# Patient Record
Sex: FEMALE | Race: WHITE
[De-identification: names, ages, dates, MRNs, and addresses within clinical notes are randomized per-mention and may not be internally consistent; named-entity substitution may affect disease eponyms.]

---

## 2018-10-30 ENCOUNTER — HOSPITAL ENCOUNTER (EMERGENCY)
Dept: HOSPITAL 25 - UCCORT | Age: 23
Discharge: HOME | End: 2018-10-30
Payer: COMMERCIAL

## 2018-10-30 VITALS — DIASTOLIC BLOOD PRESSURE: 66 MMHG | SYSTOLIC BLOOD PRESSURE: 134 MMHG

## 2018-10-30 DIAGNOSIS — Z79.899: ICD-10-CM
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
## 2018-10-30 DIAGNOSIS — M77.9: ICD-10-CM
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
## 2018-10-30 DIAGNOSIS — F41.8: ICD-10-CM

## 2018-10-30 DIAGNOSIS — M53.3: Primary | ICD-10-CM

## 2018-10-30 DIAGNOSIS — S96.912A: ICD-10-CM

## 2018-10-30 DIAGNOSIS — Z88.0: ICD-10-CM

## 2018-10-30 DIAGNOSIS — Z88.2: ICD-10-CM

## 2018-10-30 PROCEDURE — G0463 HOSPITAL OUTPT CLINIC VISIT: HCPCS

## 2018-10-30 PROCEDURE — 99201: CPT

## 2018-10-30 NOTE — UC
Back Pain HPI





- HPI Summary


HPI Summary: 





YESTERDAY AROUND 1PM, PT FELT A SUDDEN PAIN SHOOT INTO THE TIP OF HER TAILBONE 

WHILE BENDING OVER LIFTING A SHEET PAIN FROM AN OVEN.


DENIES ANY DIRECT INJURY.


PAIN OCCURS UPON BENDING FORWARD.


NO FEVER, ABDOMINAL PAIN, NUMB/WEAK EXTREMITIES, SADDLE ANESTHESIA OR BOWEL/

BLADDER DYSFUNCTION.





PT ALSO NOTES 2 DAYS HX PAIN TO THE BACK OF HER L WRIST WITH BENDING IT AND TOP 

OF L FOOT WHEN SHE STRETCHES THE FOOT BACKWARDS. NO INJURY BUT DOES A LOT OF 

REPEAT WORK AND WALKING ON HARD SURFACES AT WORK. NO ACUTE ILLNESS, FEVER OR 

SWELLING TO JOINTS.





- History of Current Complaint


Chief Complaint: UCBackPain


Stated Complaint: LOWER BACK COMPLAINT


Time Seen by Provider: 10/30/18 12:53


Hx Obtained From: Patient


Hx Last Menstrual Period: 10/16/18


Pain Intensity: 8


Associated Signs And Symptoms: Negative: Fever, Weakness, Numbness, Tingling, 

Abdominal Pain, Flank Pain, Bladder Incontinence, Bowel Incontinence





- Allergies/Home Medications


Allergies/Adverse Reactions: 


 Allergies











Allergy/AdvReac Type Severity Reaction Status Date / Time


 


Penicillins Allergy Unknown Hives Verified 10/30/18 12:50


 


Sulfa (Sulfonamide Allergy Unknown Hives Verified 10/30/18 12:50





Antibiotics)     











Home Medications: 


 Home Medications





Citalopram TAB* [CeleXA TAB*] 20 mg PO DAILY 10/30/18 [History Confirmed 10/30/

18]


Desogestrel-Ethinyl Estradiol [Enskyce 28 Tablet] 1 each PO DAILY 10/30/18 [

History Confirmed 10/30/18]


Loratadine [Claritin 10 MG CAP] 10 mg PO DAILY 10/30/18 [History Confirmed 10/30

/18]











PMH/Surg Hx/FS Hx/Imm Hx


Psychological History: Anxiety, Depression





- Surgical History


Surgical History: None





- Social History


Alcohol Use: Occasionally


Substance Use Type: None


Smoking Status (MU): Never Smoked Tobacco





- Immunization History


Vaccination Up to Date: Yes





Review of Systems


Constitutional: Negative


Skin: Negative


Eyes: Negative


ENT: Negative


Respiratory: Negative


Cardiovascular: Negative


Gastrointestinal: Negative


Genitourinary: Negative


Motor: Negative


Neurovascular: Negative


Musculoskeletal: Other: - TAILBONE PAIN. L WRIST AND FOOT PAIN


Neurological: Negative


Psychological: Negative


Is Patient Immunocompromised?: No


All Other Systems Reviewed And Are Negative: Yes





Physical Exam


Triage Information Reviewed: Yes


Appearance: Well-Appearing


Vital Signs: 


 Initial Vital Signs











Temp  98.3 F   10/30/18 12:52


 


Pulse  101   10/30/18 12:52


 


Resp  17   10/30/18 12:52


 


BP  134/66   10/30/18 12:52


 


Pulse Ox  99   10/30/18 12:52











Vital Signs Reviewed: Yes


Eyes: Positive: Conjunctiva Clear


ENT: Positive: Normal ENT inspection


Neck: Positive: Supple, Nontender, No Lymphadenopathy


Respiratory: Positive: Lungs clear, Normal breath sounds


Cardiovascular: Positive: RRR, No Murmur


Abdomen Description: Positive: Nontender, No Organomegaly, Soft


Bowel Sounds: Positive: Present


Musculoskeletal: Positive: Other: - BACK: NO GROSS DEFORMITY, SWELLING OR 

DISCOLORATION. TENDER OVER LOW SACRUM TO COCCYX BUT NO INSTABILITY, RASH OR 

FLUCTUANCE. NO SADDLE ANESTHESIA. PAIN INCREASE WITH FLEXION OF BACK BUT ROM IS 

INTACT. S/V/M IS INTACT X4.   L WRIST HAS NO DEFORMITY, SWELLING OR 

DISCOLORATION AND IS NON TENDER TO PALPATION. PT NOTES PAIN WITH EXTENSION 

AGAIST RESISTANCE. L HAND HAS FULL S/V/M FUNCTION.  L FOOT HAS NO GROSS 

DEFORMITY SWELLING OR DISCOLORATION AND IS NON TENDER TO PALPATION. PAIN WITH 

DORSIFLEXION. FOOT HAS FULL S/V/M FUNCRION.


Neurological: Positive: Alert


Psychological: Positive: Normal Response To Family, Age Appropriate Behavior


Skin Exam: Normal


Skin: Negative: rashes





Back Pain Course/Dx





- Course


Course Of Treatment: NO ACUTE ABDOMEN, NO CONCERN FOR INFECTIONS OR CAUDA 

EQUINA. WILL TX WITH NSAID. L WRIST IS C/W TENDINITIS, PT HAS WRSIT SPLINT TO 

WEAR. L FOOT C/W STRAIN. NSAID WILL ALSO TX PT WRIST AND FOOT.





- Differential Dx/Diagnosis


Provider Diagnoses: ACUTE SACRAL-COCCYX PAIN, TENDINITIS L WRIST, STRAIN L FOOT





Discharge





- Sign-Out/Discharge


Documenting (check all that apply): Patient Departure


All imaging exams completed and their final reports reviewed: No Studies





- Discharge Plan


Condition: Stable


Disposition: HOME


Prescriptions: 


Naproxen [Naprosyn 500 mg tab] 500 mg PO BID 5 Days #10 tablet


Patient Education Materials:  Muscle Strain (DC), Acute Low Back Pain (ED), 

Tendinitis (ED)


Referrals: 


Jaky Oliveros PA [Primary Care Provider] - 7 Days


Additional Instructions: 


DIAGNOSIS: ACUTE SACRAL-COCCYX PAIN, TENDINITIS LEFT WRIST, STRAIN LEFT FOOT.





USE YOUR WRIST SPLINT UNTIL WRIST IS BETTER





- Billing Disposition and Condition


Condition: STABLE


Disposition: Home

## 2019-06-17 ENCOUNTER — HOSPITAL ENCOUNTER (EMERGENCY)
Dept: HOSPITAL 25 - UCCORT | Age: 24
Discharge: HOME | End: 2019-06-17
Payer: COMMERCIAL

## 2019-06-17 VITALS — SYSTOLIC BLOOD PRESSURE: 133 MMHG | DIASTOLIC BLOOD PRESSURE: 70 MMHG

## 2019-06-17 DIAGNOSIS — R41.0: Primary | ICD-10-CM

## 2019-06-17 DIAGNOSIS — F41.8: ICD-10-CM

## 2019-06-17 PROCEDURE — 84443 ASSAY THYROID STIM HORMONE: CPT

## 2019-06-17 PROCEDURE — 36415 COLL VENOUS BLD VENIPUNCTURE: CPT

## 2019-06-17 PROCEDURE — 80053 COMPREHEN METABOLIC PANEL: CPT

## 2019-06-17 PROCEDURE — 84702 CHORIONIC GONADOTROPIN TEST: CPT

## 2019-06-17 PROCEDURE — 85025 COMPLETE CBC W/AUTO DIFF WBC: CPT

## 2019-06-17 PROCEDURE — 99211 OFF/OP EST MAY X REQ PHY/QHP: CPT

## 2019-06-17 PROCEDURE — G0463 HOSPITAL OUTPT CLINIC VISIT: HCPCS

## 2019-06-17 NOTE — UC
UC General HPI





- HPI Summary


HPI Summary: 


24-year-old female comes in with a chief complaint of feeling foggy.  Patient 

reports her last 5 days she's had some cognitive difficulties with math.  Also 

she feels like somebody be talking to her and then she comes back in the 

conversation not remembering anything of the persons said.  No headache no 

weakness no numbness no difficulty with vision or speech.  She does have a 

history of anxiety and depression no new medications.





- History of Current Complaint


Chief Complaint: UCGeneralIllness


Stated Complaint: HEAD FOGGY


Time Seen by Provider: 06/17/19 19:56


Hx Last Menstrual Period: "2 WEEKS AGO"


Pain Intensity: 0





- Allergy/Home Medications


Allergies/Adverse Reactions: 


 Allergies











Allergy/AdvReac Type Severity Reaction Status Date / Time


 


Penicillins Allergy Unknown Hives Verified 06/17/19 19:28


 


Sulfa (Sulfonamide Allergy Unknown Hives Verified 06/17/19 19:28





Antibiotics)     











Home Medications: 


 Home Medications





buPROPion TAB* [Wellbutrin TAB*] 1 tab DAILY 06/17/19 [History Confirmed 06/17/ 19]











PMH/Surg Hx/FS Hx/Imm Hx


Previously Healthy: Yes


Psychological History: Anxiety, Depression





- Surgical History


Surgical History: None





- Family History


Known Family History: Positive: Non-Contributory





- Social History


Alcohol Use: Occasionally


Substance Use Type: None


Smoking Status (MU): Never Smoked Tobacco





- Immunization History


Vaccination Up to Date: Yes





Review of Systems


All Other Systems Reviewed And Are Negative: Yes


Constitutional: Positive: Other - SEE HPI


Skin: Positive: Negative


Eyes: Positive: Negative


ENT: Positive: Negative


Respiratory: Positive: Negative


Cardiovascular: Positive: Negative


Gastrointestinal: Positive: Negative


Genitourinary: Positive: Negative


Motor: Positive: Negative


Neurovascular: Positive: Negative


Musculoskeletal: Positive: Negative


Neurological: Positive: Negative


Psychological: Positive: Anxious


Is Patient Immunocompromised?: No





Physical Exam


Triage Information Reviewed: Yes


Appearance: Well-Appearing, No Pain Distress, Well-Nourished


Vital Signs: 


 Initial Vital Signs











Temp  98.3 F   06/17/19 19:29


 


Pulse  91   06/17/19 19:29


 


Resp  16   06/17/19 19:29


 


BP  133/70   06/17/19 19:29


 


Pulse Ox  100   06/17/19 19:29











Vital Signs Reviewed: Yes


Eye Exam: Normal


Eyes: Positive: Conjunctiva Clear, Other: - PERRLA/EOMI


ENT: Positive: Pharynx normal, TMs normal


Neck: Positive: Supple


Respiratory: Positive: Lungs clear, Normal breath sounds, No respiratory 

distress


Cardiovascular: Positive: RRR


Musculoskeletal Exam: Normal


Musculoskeletal: Positive: Strength Intact, ROM Intact


Neurological Exam: Normal


Neurological: Positive: Alert, Muscle Tone Normal


Psychological Exam: Normal


Psychological: Positive: Age Appropriate Behavior


Skin Exam: Normal





Course/Dx





- Course


Course Of Treatment: 


Patient has no focal neurologic deficits.  She is describing cognitive deficits 

which I did not encounter on our interview.  We discussed getting a CT imaging 

of the brain however the patient declined it at this time.  We are getting a 

check a CBC CMP TSH and hCG.  All those results are pending.  Patient does have 

a history of anxiety and depression.  I recommended following up with the 

family practice physician and also her counselor.  Also let her know if she had 

any neurologic deficits or do not improve she should get reevaluated in the 

emergency department.





- Diagnoses


Provider Diagnosis: 


 Confusion








Discharge





- Sign-Out/Discharge


Documenting (check all that apply): Patient Departure


All imaging exams completed and their final reports reviewed: No Studies





- Discharge Plan


Condition: Stable


Disposition: HOME


Patient Education Materials:  Altered Mental Status (ED)


Referrals: 


Jaky Oliveros PA [Primary Care Provider] - 


Additional Instructions: 


FOLLOW UP WITH YOUR DOCTOR.


GET RECHECKED SOONER IF YOUR CONDITION WORSENS; WEAKNESS, NUMBNESS, DIFFICULTY 

WITH VISION OR SPEECH, YOU FEEL ILL OR ANY QUESTIONS OR CONCERNS.








- Billing Disposition and Condition


Condition: STABLE


Disposition: Home

## 2019-06-18 LAB
ALBUMIN SERPL BCG-MCNC: 4.2 G/DL (ref 3.2–5.2)
ALBUMIN/GLOB SERPL: 1.2 {RATIO} (ref 1–3)
ALP SERPL-CCNC: 85 U/L (ref 34–104)
ALT SERPL W P-5'-P-CCNC: 15 U/L (ref 7–52)
ANION GAP SERPL CALC-SCNC: 11 MMOL/L (ref 2–11)
AST SERPL-CCNC: 15 U/L (ref 13–39)
BASOPHILS # BLD AUTO: 0.1 10^3/UL (ref 0–0.2)
BUN SERPL-MCNC: 18 MG/DL (ref 6–24)
BUN/CREAT SERPL: 23.4 (ref 8–20)
CALCIUM SERPL-MCNC: 9.8 MG/DL (ref 8.6–10.3)
CHLORIDE SERPL-SCNC: 106 MMOL/L (ref 101–111)
EOSINOPHIL # BLD AUTO: 0.5 10^3/UL (ref 0–0.6)
GLOBULIN SER CALC-MCNC: 3.4 G/DL (ref 2–4)
GLUCOSE SERPL-MCNC: 90 MG/DL (ref 70–100)
HCG SERPL QL: < 0.6 MIU/ML
HCO3 SERPL-SCNC: 23 MMOL/L (ref 22–32)
HCT VFR BLD AUTO: 41 % (ref 35–47)
HGB BLD-MCNC: 13.7 G/DL (ref 12–16)
LYMPHOCYTES # BLD AUTO: 2.2 10^3/UL (ref 1–4.8)
MCH RBC QN AUTO: 28 PG (ref 27–31)
MCHC RBC AUTO-ENTMCNC: 34 G/DL (ref 31–36)
MCV RBC AUTO: 82 FL (ref 80–97)
MONOCYTES # BLD AUTO: 0.6 10^3/UL (ref 0–0.8)
NEUTROPHILS # BLD AUTO: 7.3 10^3/UL (ref 1.5–7.7)
NRBC # BLD AUTO: 0 10^3/UL
NRBC BLD QL AUTO: 0.1
PLATELET # BLD AUTO: 408 10^3/UL (ref 150–450)
POTASSIUM SERPL-SCNC: 4.1 MMOL/L (ref 3.5–5)
PROT SERPL-MCNC: 7.6 G/DL (ref 6.4–8.9)
RBC # BLD AUTO: 4.96 10^6 /UL (ref 3.7–4.87)
SODIUM SERPL-SCNC: 140 MMOL/L (ref 135–145)
TSH SERPL-ACNC: 1.91 MCIU/ML (ref 0.34–5.6)
WBC # BLD AUTO: 10.7 10^3/UL (ref 3.5–10.8)